# Patient Record
(demographics unavailable — no encounter records)

---

## 2020-01-09 NOTE — [id number]
Post Anesthesia Note





- EVALUATION WITHIN 48HRS OF ANESTHETIC


Vital Signs in Normal Range: Yes


Patient Participated in Evaluation: Yes


Respiratory Function Stable: Yes


Airway Patent: Yes


Cardiovascular Function Stable: Yes


Hydration Status Stable: Yes


Pain Control Satisfactory: Yes


Nausea and Vomiting Control Satisfactory: Yes


Mental Status Recovered: Yes


Vital Signs: 


 Last Vital Signs


1015


109/52


95 2L NC


85


16


97.8








Temp  36.8 C   01/09/20 07:50


 


Pulse  83   01/09/20 07:50


 


Resp  16   01/09/20 07:50


 


BP  119/55 L  01/09/20 07:50


 


Pulse Ox  96   01/09/20 07:50

## 2020-01-09 NOTE — PCM.PRNOTE
- Free Text/Narrative


Note: 





Date: 1/9/2020


Procedure: Screening colonoscopy


Endoscopist: Jack Valencia MD





Findings: three very small polyps biopsied with forceps. Prep was excellent. 

Ileum was intubated. 





Detailed Report:





The patient was taken to the endoscopy suite and placed in left lateral 

decubitus position.  Monitored anesthesia care was initiated after timeout was 

performed.  Visual inspection of the anus revealed no gross abnormalities.  

Digital rectal exam was unremarkable.  The lubricated colonoscope was inserted 

and advanced all the way to the cecum.  The ileocecal valve was visualized, and 

the ileum was intubated.  The scope was then slowly withdrawn and mucosal 

surfaces were inspected.  The prep was noted to be excellent.  A small 

pedunculated polyp, less than 1 cm was biopsied with the forceps somewhere in 

the transverse colon.  A small sessile lesion less than 1 cm was biopsied in 

the descending colon, and a small hyperplastic looking sessile lesion was 

biopsied in the sigmoid.  No diverticular disease was noted.  On retroflexion 

of the scope in the rectum, no significant hemorrhoidal disease was identified.

  The patient tolerated the procedure well.





Jack Valencia MD


General Surgery

## 2020-01-09 NOTE — PCM.PREANE
Preanesthetic Assessment





- Procedure


Proposed Procedure: 





Screening colonoscopy





- Anesthesia/Transfusion/Family Hx


Anesthesia History: Prior Anesthesia Reaction


Type of Anesthesia Reaction: Excessive Nausea/Vomiting


Family History of Anesthesia Reaction: No





- Review of Systems


General: No Symptoms


Pulmonary: No Symptoms (Asthma, controlled, no medications needed since 

septoplasty/turbinate reduction. )


Cardiovascular: No Symptoms


Gastrointestinal: No Symptoms


Neurological: No Symptoms


Other: Reports: None





- Physical Assessment


NPO Status Date: 01/09/20


NPO Status Time: 04:00


Vital Signs: 





 Last Vital Signs











Temp  36.8 C   01/09/20 07:50


 


Pulse  83   01/09/20 07:50


 


Resp  16   01/09/20 07:50


 


BP  119/55 L  01/09/20 07:50


 


Pulse Ox  96   01/09/20 07:50











Height: 1.65 m


Weight: 81.193 kg


ASA Class: 2


Mental Status: Alert & Oriented x3


Airway Class: Mallampati = 2


Dentition: Reports: Normal Dentition


Thyro-Mental Finger Breadths: 3


Mouth Opening Finger Breadths: 3


ROM/Head Extension: Full


Lungs: Clear to Auscultation, Normal Respiratory Effort


Cardiovascular: Regular Rate, Regular Rhythm





- Allergies


Allergies/Adverse Reactions: 


 Allergies











Allergy/AdvReac Type Severity Reaction Status Date / Time


 


amoxicillin [From Augmentin] Allergy  Cannot Verified 01/08/20 17:31





   Remember  


 


clavulanic acid Allergy  Cannot Verified 01/08/20 17:31





[From Augmentin]   Remember  


 


nut - unspecified Allergy  Cannot Verified 01/08/20 17:31





   Remember  


 


pollen extracts Allergy  Cannot Verified 01/08/20 17:31





   Remember  














- Acknowledgements


Anesthesia Type Planned: General Anesthesia


Pt an Appropriate Candidate for the Planned Anesthesia: Yes


Alternatives and Risks of Anesthesia Discussed w Pt/Guardian: Yes


Pt/Guardian Understands and Agrees with Anesthesia Plan: Yes


Additional Comments: 





Shanon does well with IV antinausea medications. She has not had to use a 

scopolamine patch in the past. 





PreAnesthesia Questionnaire


Cardiovascular History: Reports: None


Respiratory History: Reports: Asthma


OB/GYN History: Reports: Other (See Below)


Other OB/BYN History: hysteroscopic myomectomy


Musculoskeletal History: Reports: Osteoporosis, Other (See Below)


Other Musculoskeletal History: wrist surgery


Neurological History: Reports: None


Psychiatric History: Reports: None


Endocrine/Metabolic History: Reports: None


Hematologic History: Reports: None


Immunologic History: Reports: None


Oncologic (Cancer) History: Reports: None


Dermatologic History: Reports: None





- Past Surgical History


Head Surgeries/Procedures: Reports: None


HEENT Surgical History: Reports: Naso-Sinus Surgery


Cardiovascular Surgical History: Reports: None


Respiratory Surgical History: Reports: None


GI Surgical History: Reports: Cholecystectomy, Colonoscopy


Female  Surgical History: Reports: Hysterectomy


Endocrine Surgical History: Reports: None


Neurological Surgical History: Reports: None


Musculoskeletal Surgical History: Reports: None


Oncologic Surgical History: Reports: None


Dermatological Surgical History: Reports: None





- SUBSTANCE USE


Smoking Status *Q: Never Smoker





- HOME MEDS


Home Medications: 


 Home Meds





. [No Known Home Meds]  01/08/20 [History]











- CURRENT (IN HOUSE) MEDS


Current Meds: 





 Current Medications





Lactated Ringer's (Ringers, Lactated)  1,000 mls @ 125 mls/hr IV ASDIRECTED MINNIE


   Stop: 01/09/20 23:00


   Last Admin: 01/09/20 08:05 Dose:  125 mls/hr


Lidocaine/Sodium Bicarbonate (Buffered Lidocaine 1% In Ns 8.4%)  0.25 ml IDERM 

ONETIME PRN


   PRN Reason: Prior to IV Start


   Stop: 01/09/20 18:00


   Last Admin: 01/09/20 08:04 Dose:  0.25 ml


Sodium Chloride (Saline Flush)  10 ml FLUSH ASDIRECTED PRN


   PRN Reason: Keep Vein Open


   Stop: 01/09/20 18:00





Discontinued Medications





Ephedrine Sulfate (Ephedrine In Ns) Confirm Administered Dose 25 mg .ROUTE .STK-

MED ONE


   Stop: 01/09/20 08:16


Lidocaine HCl (Xylocaine-Mpf 1%) Confirm Administered Dose 4 mls @ as directed 

.ROUTE .STK-MED ONE


   Stop: 01/09/20 08:15


Lidocaine HCl (Xylocaine-Mpf 1%) Confirm Administered Dose 2 mls @ as directed 

.ROUTE .STK-MED ONE


   Stop: 01/09/20 08:16


Propofol (Diprivan  20 Ml) Confirm Administered Dose 200 mg .ROUTE .STK-MED ONE


   Stop: 01/09/20 08:16

## 2021-06-30 NOTE — CR
Abdomen: Supine and upright views of the abdomen were obtained.

 

Comparison: Previous abdominal x-ray of 09/08/20.

 

Scoliosis and mild degenerative change is seen within the spine.  

Bowel gas pattern appears within normal limits.  No abnormal 

calcifications or soft tissue abnormality is seen.  No free air is 

seen.

 

Impression:

1.  Findings as noted above.  Nothing acute is seen.

 

Diagnostic code #2

## 2021-06-30 NOTE — EDM.PDOC
ED HPI GENERAL MEDICAL PROBLEM





- General


Chief Complaint: Gastrointestinal Problem


Stated Complaint: CONSTIPATED


Time Seen by Provider: 06/30/21 11:03


Source of Information: Reports: Patient, RN Notes Reviewed





- History of Present Illness


INITIAL COMMENTS - FREE TEXT/NARRATIVE: 





68 yr old female with concerns about constipation.  Did have a BM 2 days ago, 

very small BM today but not "near normal for her" No current abd pain.  No 

nausea or vomting.   Has been eating and drinking OK.  


Treatments PTA: Reports: Acetaminophen, Other Medication(s)


  ** Abdominal


Pain Score (Numeric/FACES): 6





- Related Data


                                    Allergies











Allergy/AdvReac Type Severity Reaction Status Date / Time


 


amoxicillin [From Augmentin] Allergy Severe Cannot Verified 06/30/21 10:39





   Remember  


 


clavulanic acid Allergy Severe Cannot Verified 06/30/21 10:39





[From Augmentin]   Remember  


 


honey Allergy  Respiratory Verified 06/30/21 10:39





   Distress  


 


nut - unspecified Allergy  Cannot Verified 06/30/21 10:39





   Remember  


 


pollen extracts Allergy  Cannot Verified 06/30/21 10:39





   Remember  











Home Meds: 


                                    Home Meds





Calcium Carbonate [Calcium] 1,000 mg PO DAILY 09/08/20 [History]


Cholecalciferol (Vitamin D3) [Vitamin D3] 3,000 unit PO DAILY 09/08/20 [History]


Multivit with Calcium,Iron,Min [One Daily Women's] 1 each PO DAILY 09/08/20 

[History]


polyethylene glycoL 3350 [MiraLAX] 17 gm PO DAILY #1 cont 09/08/20 [Rx]


Turmeric 400 mg PO DAILY 06/30/21 [History]











Past Medical History


HEENT History: Reports: None


Cardiovascular History: Reports: None


Respiratory History: Reports: Asthma


Gastrointestinal History: Reports: Chronic Constipation


Genitourinary History: Reports: Other (See Below)


Other Genitourinary History: 1cm cyst on each kidney


OB/GYN History: Reports: Ectopic Pregnancy, Other (See Below)


Other OB/GYN History: hysteroscopic myomectomy


Musculoskeletal History: Reports: Osteoporosis


Other Musculoskeletal History: wrist surgery


Neurological History: Reports: None


Psychiatric History: Reports: None


Endocrine/Metabolic History: Reports: None


Hematologic History: Reports: None


Immunologic History: Reports: None


Oncologic (Cancer) History: Reports: None


Dermatologic History: Reports: None





- Infectious Disease History


Infectious Disease History: Reports: Measles





- Past Surgical History


Head Surgeries/Procedures: Reports: None


HEENT Surgical History: Reports: Naso-Sinus Surgery


Cardiovascular Surgical History: Reports: None


Respiratory Surgical History: Reports: None


GI Surgical History: Reports: Appendectomy, Colonoscopy


Female  Surgical History: Reports: Other (See Below)


Endocrine Surgical History: Reports: None


Neurological Surgical History: Reports: Other (See Below)


Other Neurological Surgeries/Procedures: Reconstruction of the nerves in the 

right arm.


Musculoskeletal Surgical History: Reports: Other (See Below)


Other Musculoskeletal Surgeries/Procedures:: Wrist Surgery


Oncologic Surgical History: Reports: None


Dermatological Surgical History: Reports: None





Social & Family History





- Family History


Family Medical History: No Pertinent Family History


Cardiac: Reports: CAD, MI


Neurological: Reports: CVA





- Tobacco Use


Tobacco Use Status *Q: Never Tobacco User





- Caffeine Use


Caffeine Use: Reports: Tea





- Recreational Drug Use


Recreational Drug Use: No





- Living Situation & Occupation


Living situation: Reports: 


Occupation: Retired





ED ROS GENERAL





- Review of Systems


Review Of Systems: See Below


Constitutional: Denies: Fever, Chills, Diaphoresis


HEENT: Reports: No Symptoms


Respiratory: Denies: Shortness of Breath, Pleuritic Chest Pain


Cardiovascular: Denies: Chest Pain


GI/Abdominal: Reports: Constipation.  Denies: Abdominal Pain, Nausea, Vomiting


Musculoskeletal: Reports: No Symptoms


Skin: Reports: No Symptoms


Neurological: Reports: No Symptoms





ED EXAM, GI/ABD





- Physical Exam


Exam: See Below


General Appearance: Alert, No Apparent Distress


Head: Atraumatic


Neck: Supple


Respiratory/Chest: No Respiratory Distress, Lungs Clear, Normal Breath Sounds


Cardiovascular: Regular Rate, Rhythm


GI/Abdominal Exam: Soft, Non-Tender.  No: Guarding


Back Exam: No: CVA Tenderness (L), CVA Tenderness (R)


Extremities: Normal Inspection


Neurological: Alert, Oriented, No Motor/Sensory Deficits


Skin Exam: Warm, Dry, Normal Color





Course





- Vital Signs


Last Recorded V/S: 


                                Last Vital Signs











Temp  98.7 F   06/30/21 10:44


 


Pulse  77   06/30/21 10:44


 


Resp  16   06/30/21 10:44


 


BP  141/67 H  06/30/21 10:44


 


Pulse Ox  96   06/30/21 10:44














- Orders/Labs/Meds


Meds: 


Medications














Discontinued Medications














Generic Name Dose Route Start Last Admin





  Trade Name Freq  PRN Reason Stop Dose Admin


 


Magnesium Citrate  296 ml  06/30/21 11:44  06/30/21 12:02





  Magnesium Citrate Solution 296 Ml Bottle  PO  06/30/21 11:45  296 ml





  ONETIME ONE   Administration














- Re-Assessments/Exams


Free Text/Narrative Re-Assessment/Exam: 





06/30/21 20:11


Flat and upright, mod. scattered stool and gas, not excessive, no air fluid 

levels.   Discharge instr. as documented.  





Departure





- Departure


Time of Disposition: 11:45


Disposition: Home, Self-Care 01


Condition: Fair


Clinical Impression: 


Constipation


Qualifiers:


 Constipation type: unspecified constipation type Qualified Code(s): K59.00 - 

Constipation, unspecified








- Discharge Information


Instructions:  Constipation, Adult, Easy-to-Read


Referrals: 


Bianca Rivas NP [Primary Care Provider] - 


Forms:  ED Department Discharge


Additional Instructions: 


mag citrate laxative.  Drink 1/2 bottle now, drink the remainder later today if 

no BM by later this afternoon.  Continue prunes or prune juice daily.  Try eat 

plenty of fruit and vegetables and other fiber foods.  Consider taking a stool 

softner such as pericolace once or twice daily.  Follow up clinic as needed.  

Return to ED as needed if symptoms worsening in any way.  





Sepsis Event Note (ED)





- Evaluation


Sepsis Screening Result: No Definite Risk





- Focused Exam


Vital Signs: 


                                   Vital Signs











  Temp Pulse Resp BP Pulse Ox


 


 06/30/21 10:44  98.7 F  77  16  141/67 H  96